# Patient Record
Sex: FEMALE | Race: WHITE | URBAN - METROPOLITAN AREA
[De-identification: names, ages, dates, MRNs, and addresses within clinical notes are randomized per-mention and may not be internally consistent; named-entity substitution may affect disease eponyms.]

---

## 2023-07-20 ENCOUNTER — OFFICE VISIT (OUTPATIENT)
Facility: LOCATION | Age: 63
End: 2023-07-20
Payer: COMMERCIAL

## 2023-07-20 DIAGNOSIS — H04.123 DRY EYE SYNDROME OF BILATERAL LACRIMAL GLANDS: ICD-10-CM

## 2023-07-20 DIAGNOSIS — H35.52 PIGMENTARY RETINAL DYSTROPHY: ICD-10-CM

## 2023-07-20 DIAGNOSIS — H47.092 OTH DISORDERS OF OPTIC NERVE, NEC, LEFT EYE: ICD-10-CM

## 2023-07-20 DIAGNOSIS — H40.1131 PRIMARY OPEN-ANGLE GLAUCOMA BILATERAL MILD STAGE: Primary | ICD-10-CM

## 2023-07-20 PROCEDURE — 92134 CPTRZ OPH DX IMG PST SGM RTA: CPT | Performed by: OPHTHALMOLOGY

## 2023-07-20 PROCEDURE — 92020 GONIOSCOPY: CPT | Performed by: OPHTHALMOLOGY

## 2023-07-20 PROCEDURE — 99214 OFFICE O/P EST MOD 30 MIN: CPT | Performed by: OPHTHALMOLOGY

## 2023-07-20 PROCEDURE — 92083 EXTENDED VISUAL FIELD XM: CPT | Performed by: OPHTHALMOLOGY

## 2023-07-20 RX ORDER — LATANOPROST 50 UG/ML
0.005 % SOLUTION OPHTHALMIC
Qty: 7.5 | Refills: 3 | Status: INACTIVE
Start: 2023-07-20 | End: 2024-07-18

## 2023-07-20 ASSESSMENT — INTRAOCULAR PRESSURE
OS: 15
OD: 15

## 2023-07-20 NOTE — IMPRESSION/PLAN
Impression: Examination revealed dry eye syndrome secondary to tear deficiencies. Trace SPK OU. Plan: Plan:
continue ATs PRN OU.

## 2023-07-20 NOTE — IMPRESSION/PLAN
Impression: 13 month f/u with HVF10-2 SF and DFE for POAG, and OCT MAC OU. Late by 1 month. H/o of poor compliance with medication. POHx: POAG Mild OU, Optic Nerve Pit OS. FOHx: Negative to glaucoma. PMHx: Thyroid problems, Seasonal allergies. Eye meds: Latanoprost QHS OU, ATs PRN OU. Good reduction with Latanoprost 20/16 -> 14/13. TMAX: 20/20. Target IOP: pending. Plan: Testing:
Color plates: FUll OU. OCT Mac 7/2023: OD normal, OS inferior inner retina thinning. Stable OU. OCT/ONH 06/2022: OD WNL, OS severe IT NFL and GCC thinning. OU improved and stable. HVF 24-2 06/2022: OD WNL, OS superior paracentral. OU stable. HVF 10-2 7/2023: OD normal, OS dense SAS. Baseline. Pachy: 540/536. Gonio 07/2023: SS 1+ 360 OU. Today:
IOP acceptable OU. OCT/MAC and HVF 10-2 SF performed and reviewed today. Informed patient exam and testing today are stable, no concerns for progression at this time. Discussed importance of continued monitoring and compliance with glaucoma gtts. Plan:
Continue Latanoprost QAM OU. RTC in 1 year with OCT ONH OU, HVF 10-2 SF OS only and DFE OU.

## 2024-10-11 ENCOUNTER — OFFICE VISIT (OUTPATIENT)
Facility: LOCATION | Age: 64
End: 2024-10-11
Payer: COMMERCIAL

## 2024-10-11 DIAGNOSIS — H40.1131 PRIMARY OPEN-ANGLE GLAUCOMA BILATERAL MILD STAGE: Primary | ICD-10-CM

## 2024-10-11 DIAGNOSIS — H47.092 OTH DISORDERS OF OPTIC NERVE, NEC, LEFT EYE: ICD-10-CM

## 2024-10-11 DIAGNOSIS — H40.1122 PRIMARY OPEN-ANGLE GLAUCOMA, LEFT EYE, MODERATE STAGE: ICD-10-CM

## 2024-10-11 DIAGNOSIS — H35.52 PIGMENTARY RETINAL DYSTROPHY: ICD-10-CM

## 2024-10-11 DIAGNOSIS — H04.123 DRY EYE SYNDROME OF BILATERAL LACRIMAL GLANDS: ICD-10-CM

## 2024-10-11 PROCEDURE — 92020 GONIOSCOPY: CPT | Performed by: OPHTHALMOLOGY

## 2024-10-11 PROCEDURE — 92083 EXTENDED VISUAL FIELD XM: CPT | Performed by: OPHTHALMOLOGY

## 2024-10-11 PROCEDURE — 92134 CPTRZ OPH DX IMG PST SGM RTA: CPT | Performed by: OPHTHALMOLOGY

## 2024-10-11 PROCEDURE — 99214 OFFICE O/P EST MOD 30 MIN: CPT | Performed by: OPHTHALMOLOGY

## 2024-10-11 RX ORDER — LATANOPROST 50 UG/ML
0.005 % SOLUTION OPHTHALMIC
Qty: 7.5 | Refills: 10 | Status: ACTIVE
Start: 2024-10-11

## 2024-10-11 ASSESSMENT — VISUAL ACUITY
OD: 20/20
OS: 20/20

## 2024-10-11 ASSESSMENT — INTRAOCULAR PRESSURE
OS: 16
OD: 16